# Patient Record
Sex: FEMALE | Race: WHITE | URBAN - METROPOLITAN AREA
[De-identification: names, ages, dates, MRNs, and addresses within clinical notes are randomized per-mention and may not be internally consistent; named-entity substitution may affect disease eponyms.]

---

## 2017-01-31 ENCOUNTER — IMPORTED ENCOUNTER (OUTPATIENT)
Dept: URBAN - METROPOLITAN AREA CLINIC 50 | Facility: CLINIC | Age: 77
End: 2017-01-31

## 2017-01-31 NOTE — PATIENT DISCUSSION
"""Annual Type 1 Diabetic eye exam with dilation. Mild diabetic retinopathy found. Macular edema is not present in the right eye. Recommend annual dilated examinations. Patient instructed to call office immediately if sudden changes in vision occur. Emphasized importance of good blood glucose control. Summary of care provided to the physician managing the ongoing diabetes care."" ""Annual Type 1 Diabetic eye exam with dilation. Mild diabetic retinopathy found. Macular edema is not present in the leftt eye. Recommend annual dilated examinations. Patient instructed to call office immediately if sudden changes in vision occur. Emphasized importance of good blood glucose control. Summary of care provided to the physician managing the ongoing diabetes care. """

## 2017-05-11 NOTE — PATIENT DISCUSSION
(H25.13) Age-related nuclear cataract, bilateral - Assesment : Examination revealed cataract. CL Wearer - Plan : Monitor for changes. Advised patient of condition of cataracts. Discussed symptoms of cataracts worsening and becoming more bothersome. Cataract handout given today. Pt to call our office with decreased vision or increased symptoms. Updated GL RX and CLRx given today. RTC in 1 year for CL Exam, sooner if problems or changes occur.

## 2017-05-11 NOTE — PATIENT DISCUSSION
(F55.151) Vitreous degeneration, bilateral - Assesment : Examination revealed PVD OU. No changes reported. No holes or tears noted today. - Plan : Monitor for changes. Advised patient to call our office with decreased vision or an increase in flashes and/or floaters.

## 2017-05-11 NOTE — PATIENT DISCUSSION
(E11.9) Type 2 diabetes mellitus without complications - Assesment : Patient has type II diabetes with no ocular complications. - Plan : Continue care with PCP. Monitor blood sugar and A1C. Recommended yearly dilated eye exams to monitor for ocular changes. Letter sent to PCP with today's findings.

## 2017-10-18 NOTE — PATIENT DISCUSSION
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 Pt notices changing in size. Pt is bothered. - Plan : Refer to Dr. Lesley Carreno for Minor Sx to remove. Pt to see Dr. Lesley Carreno prior to minor.

## 2017-10-19 NOTE — PATIENT DISCUSSION
(M82.419) Cysts of left upper eyelid - Assesment : Examination revealed cysts on eyelid.    Medial VEENA along lash line  LLL below lash line - Plan : Minor Sx LLL and VEENA

## 2017-11-10 NOTE — PATIENT DISCUSSION
(O25.605) Cysts of left upper eyelid - Assesment : Examination revealed cysts on eyelid.    Medial VEENA along lash line  LLL below lash line - Plan : Minor Sx LLL and VEENA Start eryhtromycin rizwan BID VEENA and LLL for 5 days Rtc as scheduled

## 2017-11-27 NOTE — PATIENT DISCUSSION
(D23.12) Oth benign neoplasm skin/ left eyelid, including canthus - Assesment : Examination revealed benign neoplasm on LLL 2.5 X 2.5 2 mm below the lashes medial Punctal line H/O BCC - Plan : Minor sx/Biopsy LLL

## 2017-11-30 NOTE — PATIENT DISCUSSION
(D23.12) Oth benign neoplasm skin/ left eyelid, including canthus - Assesment : Examination revealed benign neoplasm on LLL 2.5 X 2.5 2 mm below the lashes medial Punctal line H/O BCC - Plan : Minor sx today/Biopsy LLL today Start erythromycin BID LLL 1 week 1 week follow up

## 2017-12-07 NOTE — PATIENT DISCUSSION
(D23.12) Oth benign neoplasm skin/ left eyelid, including canthus - Assesment : Examination revealed benign neoplasm on LLL 2.5 X 2.5 2 mm below the lashes medial Punctal line H/O BCC Path report reviewed with patient, not cancer. - Plan : OBSERVATION  Exam 2-3 months

## 2018-04-19 NOTE — PATIENT DISCUSSION
(E11.9) Type 2 diabetes mellitus without complications - Assesment : Patient has type II diabetes with no ocular complications. No edema or hemorrhages seen in either eye today - Plan : Continue following with PCP for routine care. Stressed importance of keeping A1c levels below 6.5 and monitoring blood sugar. Letter explaining today's findings faxed to patient's PCP. RV 1 Year for Complete Exam, or sooner if having problems or changes in vision.

## 2018-04-19 NOTE — PATIENT DISCUSSION
(H25.13) Age-related nuclear cataract, bilateral - Assesment : Examination revealed cataract. Advised patient of condition of cataracts. Discussed symptoms of cataracts worsening and becoming more bothersome.   CL Wearer Discussed ROF with patient - Plan : Sx counseling/A-scan

## 2021-04-17 ASSESSMENT — VISUAL ACUITY
OD_OTHER: 20/70. >20/400.
OD_BAT: 20/70
OD_CC: J2
OD_CC: 20/40
OD_PH: 20/40
OS_BAT: 20/200
OS_CC: J2
OS_OTHER: 20/200. >20/400.

## 2021-04-17 ASSESSMENT — TONOMETRY
OS_IOP_MMHG: 20
OD_IOP_MMHG: 20